# Patient Record
Sex: FEMALE | Race: WHITE
[De-identification: names, ages, dates, MRNs, and addresses within clinical notes are randomized per-mention and may not be internally consistent; named-entity substitution may affect disease eponyms.]

---

## 2017-09-19 ENCOUNTER — HOSPITAL ENCOUNTER (OUTPATIENT)
Dept: HOSPITAL 80 - FIMAGING | Age: 62
End: 2017-09-19
Attending: INTERNAL MEDICINE
Payer: COMMERCIAL

## 2017-09-19 DIAGNOSIS — Z12.31: Primary | ICD-10-CM

## 2017-09-19 PROCEDURE — G0202 SCR MAMMO BI INCL CAD: HCPCS

## 2017-11-04 ENCOUNTER — HOSPITAL ENCOUNTER (EMERGENCY)
Dept: HOSPITAL 80 - FED | Age: 62
Discharge: HOME | End: 2017-11-04
Payer: COMMERCIAL

## 2017-11-04 VITALS
DIASTOLIC BLOOD PRESSURE: 99 MMHG | SYSTOLIC BLOOD PRESSURE: 152 MMHG | RESPIRATION RATE: 16 BRPM | TEMPERATURE: 97.9 F | HEART RATE: 84 BPM | OXYGEN SATURATION: 93 %

## 2017-11-04 DIAGNOSIS — G44.89: Primary | ICD-10-CM

## 2017-11-04 DIAGNOSIS — E86.9: ICD-10-CM

## 2017-11-04 DIAGNOSIS — K59.00: ICD-10-CM

## 2017-11-04 PROCEDURE — 3E0337Z INTRODUCTION OF ELECTROLYTIC AND WATER BALANCE SUBSTANCE INTO PERIPHERAL VEIN, PERCUTANEOUS APPROACH: ICD-10-PCS | Performed by: EMERGENCY MEDICINE

## 2017-11-04 NOTE — EDPHY
HPI/HX/ROS/PE/MDM


Narrative: 





CHIEF COMPLAINT: Headache





HPI: The patient is a 63 y/o female arriving with her  complaining of 

headache onset Thursday, two days ago. She had elective abdominal surgery (

tummy tuck) on Thursday and had to be put on oxygen for shallow breathing in 

the recovery room. It took her several hours to come out of anesthesia. Upon 

waking up she began experiencing a headache that has not improved since. She 

describes the headache as in the frontal area of her head and "splitting". The 

prescribed analgesics (oxycodone) have not improved her headache. She has 

associated nausea and vomiting that interferes with her ability to eat. She 

denies any abdominal pain, visual changes, or other associated symptoms. She 

reports experiencing similar symptoms following a different surgery.





REVIEW OF SYSTEMS:


Aside from elements discussed in the HPI, a comprehensive 10-point review of 

systems was reviewed and is negative.





PMH: Hysterectomy, tummy tuck





SOCIAL HISTORY:  at bedside,





PHYSICAL EXAM:


General:Patient is alert, in no acute distress.


ENT:Eyes are normal to inspection.  ENT inspection normal.


Neck: Normal inspection.  Full range of motion.


Respiratory:No respiratory distress.  Breath sounds normal bilaterally.


Cardiovascular: Regular rate and rhythm.  Strong peripheral pulses.  Normal cap 

refill.


Abdomen:Bilateral MARTIN drains. The abdomen is nontender to palpation. There are 

no peritoneal signs. There are normal bowel sounds.


Back: Normal to inspection.  No tenderness to palpation.


Skin:  Horizontal incision on her abdomen that appears to be healing well. 

Normal color.  No rash.  Warm and dry.


Extremities: Normal appearance.  Full range of motion.


Neuro: Oriented x3.  Normal motor function.  Normal sensory function.





ED Course: 





Study: X-ray of the abdomen


Indication:    Nausea and vomiting post-surgery


Results: X-ray of the abdomen was obtained. The results of the study are: 

constipation and pleural effusions





The study was read by the radiologist, Dr. Navas. I viewed the images 

myself on the PACS system.











I assessed this patient and advised a CT which she refused and requested a shot 

of morphine which helped her in the past. I agreed to this plan and emphasized 

that a CT would be available if she changes her mind.  





I reassessed this patient and informed her of the results of her workup. I 

advised she can return home and she agrees to this plan. Follow-up instructions 

and return precautions given. 


MDM: 





This patient presents with complaint of headache since awakening from 

anesthesia after abdominoplasty a few days ago.  This is similar to another 

headache she had after an operation.  She declines imaging of her brain, and is 

aware I cannot rule out bleed, tumor, CVA or thrombus without testing.  

Incidentally, her RA O2 sat was noted to be quite low.  The patient was 

discharged from the hospital with home O2 but per  patient has been 

noncompliant with this as well as with IS use.  We started patient on O2 here 

and gave reglan IV, the combination of which seems to have improved her 

symptoms quite a bit.  Her abd xr does not show obstruction, but she does 

appear fairly constipated.  It sounds like patient has not really been 

complaint with stool softeners.  After much discussion, patient would like to 

go home.  I strongly encouraged her to ambulate as much as possible and to cut 

down on narcotic use.  We discussed strict return precautions. 





- Data Points


Imaging Results: 


 Imaging Impressions





Abdomen X-Ray  11/04/17 19:41


Impression: 


1. Moderate stool in the proximal colon without evidence of obstruction.


2. Small bilateral pleural effusions with probable associated atelectasis.








Chest X-Ray  11/04/17 19:41


Impression:  Small bilateral pleural effusions with streaky basilar opacities 

suggesting atelectasis.











Imaging: Discussed imaging studies w/ On call Radiologist, I viewed and 

interpreted images myself


Medications Given: 


 








Discontinued Medications





Diphenhydramine HCl (Benadryl Injection)  25 mg IVP EDNOW ONE


   Stop: 11/04/17 19:22


   Last Admin: 11/04/17 19:32 Dose:  25 mg


Sodium Chloride (Ns)  1,000 mls @ 0 mls/hr IV ONCE ONE; Wide Open


   PRN Reason: Protocol


   Stop: 11/04/17 19:16


   Last Admin: 11/04/17 19:19 Dose:  1,000 mls


Sodium Chloride (Ns)  1,000 mls @ 0 mls/hr IV EDNOW ONE; Wide Open


   PRN Reason: Protocol


   Stop: 11/04/17 20:30


   Last Admin: 11/04/17 20:32 Dose:  1,000 mls


Metoclopramide HCl (Reglan Injection)  10 mg IVP EDNOW ONE


   Stop: 11/04/17 19:22


   Last Admin: 11/04/17 19:32 Dose:  10 mg


Morphine Sulfate (Morphine)  4 mg IVP EDNOW ONE


   Stop: 11/04/17 19:23


   Last Admin: 11/04/17 20:21 Dose:  Not Given


Ondansetron HCl (Zofran)  4 mg IVP EDNOW ONE


   Stop: 11/04/17 19:16


   Last Admin: 11/04/17 19:19 Dose:  4 mg








General


Time Seen by Provider: 11/04/17 19:09


Initial Vital Signs: 


 Initial Vital Signs











Temperature (C)  36.9 C   11/04/17 18:53


 


Heart Rate  85   11/04/17 18:53


 


Respiratory Rate  20   11/04/17 18:53


 


Blood Pressure  158/108 H  11/04/17 18:53


 


O2 Sat (%)  89 L  11/04/17 18:53








 











O2 Delivery Mode               Room Air


 


O2 (L/minute)                  15














Allergies/Adverse Reactions: 


 





No Known Allergies Allergy (Verified 11/04/17 18:52)


 








Home Medications: 














 Medication  Instructions  Recorded


 


Acetaminophen [Tylenol 325mg (*)] 325 mg PO 11/04/17


 


Cephalexin [Keflex (*)] 250 mg PO 11/04/17


 


Cyclobenzaprine [Flexeril 10 MG 10 mg PO 11/04/17





(*)]  


 


Docusate Sodium [Colace 100 MG (*)] 100 mg PO BID 11/04/17


 


H R T  11/04/17


 


Hydrocodone/APAP 5/325 [Norco 1 each PO 11/04/17





5/325 (*)]  


 


Ondansetron Odt [Zofran Odt 4 mg 4 mg PO Q4 11/04/17





(*)]  


 


oxyCODONE HCL [Oxycodone HCl] 5 mg PO 11/04/17














Departure





- Departure


Disposition: Home, Routine, Self-Care


Clinical Impression: 


Constipation


Qualifiers:


 Constipation type: other constipation type Qualified Code(s): K59.09 - Other 

constipation





Headache


Qualifiers:


 Headache type: other headache syndrome Qualified Code(s): G44.89 - Other 

headache syndrome





Condition: Good


Instructions:  Constipation (ED), Acute Headache (ED)


Additional Instructions: 


1. Continue taking prescribed medicines as directed as needed.


2. Follow-up with your primary care provider for unimproved symptoms in 2-3 

days.


3. Return to the ED for worsening of condition.


Referrals: 


Sulma Peter MD [Primary Care Provider] - As per Instructions


Report Scribed for: Reji Sands


Report Scribed by: Amisha Casarez


Date of Report: 11/04/17


Time of Report: 19:24


Physician Review and Approval Statement: Portions of this note were transcribed 

by an ED scribe.  I personally performed the history, physical exam, and 

medical decision making; and confirm the accuracy of the information in the 

transcribed note.

## 2017-11-04 NOTE — EDPHY
HPI/HX/ROS/PE/MDM


Narrative: 





CHIEF COMPLAINT: Headache





HPI: The patient is a 61 y/o female arriving with her  complaining of 

headache onset Thursday, two days ago. She had elective abdominal surgery (

tummy tuck) on Thursday and had to be put on oxygen for shallow breathing in 

the recovery room. It took her several hours to come out of anesthesia. Upon 

waking up she began experiencing a headache that has not improved since. She 

describes the headache as in the frontal area of her head and "splitting". The 

prescribed analgesics (oxycodone) have not improved her headache. She has 

associated nausea and vomiting that interferes with her ability to eat. She 

denies any abdominal pain, visual changes, or other associated symptoms. She 

reports experiencing similar symptoms following a different surgery.





REVIEW OF SYSTEMS:


Aside from elements discussed in the HPI, a comprehensive 10-point review of 

systems was reviewed and is negative.





PMH: Hysterectomy, tummy tuck





SOCIAL HISTORY:  at bedside,





PHYSICAL EXAM:


General:Patient is alert, in no acute distress.


ENT:Eyes are normal to inspection.  ENT inspection normal.


Neck: Normal inspection.  Full range of motion.


Respiratory:No respiratory distress.  Breath sounds normal bilaterally.


Cardiovascular: Regular rate and rhythm.  Strong peripheral pulses.  Normal cap 

refill.


Abdomen:Bilateral MARTIN drains. The abdomen is nontender to palpation. There are 

no peritoneal signs. There are normal bowel sounds.


Back: Normal to inspection.  No tenderness to palpation.


Skin:  Horizontal incision on her abdomen that appears to be healing well. 

Normal color.  No rash.  Warm and dry.


Extremities: Normal appearance.  Full range of motion.


Neuro: Oriented x3.  Normal motor function.  Normal sensory function.





ED Course: 





Study: X-ray of the abdomen


Indication:    Nausea and vomiting post-surgery


Results: X-ray of the abdomen was obtained. The results of the study are: 

constipation and pleural effusions





The study was read by the radiologist, Dr. Navas. I viewed the images 

myself on the PACS system.











I assessed this patient and advised a CT which she refused and requested a shot 

of morphine which helped her in the past. I agreed to this plan and emphasized 

that a CT would be available if she changes her mind.  





I reassessed this patient and informed her of the results of her workup. I 

advised she can return home and she agrees to this plan. Follow-up instructions 

and return precautions given. 


MDM: 





This patient presents with complaint of headache since awakening from 

anesthesia after abdominoplasty a few days ago.  This is similar to another 

headache she had after an operation.  She declines imaging of her brain, and is 

aware I cannot rule out bleed, tumor, CVA or thrombus without testing.  

Incidentally, her RA O2 sat was noted to be quite low.  The patient was 

discharged from the hospital with home O2 but per  patient has been 

noncompliant with this as well as with IS use.  We started patient on O2 here 

and gave reglan IV, the combination of which seems to have improved her 

symptoms quite a bit.  Her abd xr does not show obstruction, but she does 

appear fairly constipated.  It sounds like patient has not really been 

complaint with stool softeners.  After much discussion, patient would like to 

go home.  I strongly encouraged her to ambulate as much as possible and to cut 

down on narcotic use.  We discussed strict return precautions. 





- Data Points


Imaging Results: 


 Imaging Impressions





Abdomen X-Ray  11/04/17 19:41


Impression: 


1. Moderate stool in the proximal colon without evidence of obstruction.


2. Small bilateral pleural effusions with probable associated atelectasis.








Chest X-Ray  11/04/17 19:41


Impression:  Small bilateral pleural effusions with streaky basilar opacities 

suggesting atelectasis.











Imaging: Discussed imaging studies w/ On call Radiologist, I viewed and 

interpreted images myself


Medications Given: 


 








Discontinued Medications





Diphenhydramine HCl (Benadryl Injection)  25 mg IVP EDNOW ONE


   Stop: 11/04/17 19:22


   Last Admin: 11/04/17 19:32 Dose:  25 mg


Sodium Chloride (Ns)  1,000 mls @ 0 mls/hr IV ONCE ONE; Wide Open


   PRN Reason: Protocol


   Stop: 11/04/17 19:16


   Last Admin: 11/04/17 19:19 Dose:  1,000 mls


Sodium Chloride (Ns)  1,000 mls @ 0 mls/hr IV EDNOW ONE; Wide Open


   PRN Reason: Protocol


   Stop: 11/04/17 20:30


   Last Admin: 11/04/17 20:32 Dose:  1,000 mls


Metoclopramide HCl (Reglan Injection)  10 mg IVP EDNOW ONE


   Stop: 11/04/17 19:22


   Last Admin: 11/04/17 19:32 Dose:  10 mg


Morphine Sulfate (Morphine)  4 mg IVP EDNOW ONE


   Stop: 11/04/17 19:23


   Last Admin: 11/04/17 20:21 Dose:  Not Given


Ondansetron HCl (Zofran)  4 mg IVP EDNOW ONE


   Stop: 11/04/17 19:16


   Last Admin: 11/04/17 19:19 Dose:  4 mg








General


Time Seen by Provider: 11/04/17 19:09


Initial Vital Signs: 


 Initial Vital Signs











Temperature (C)  36.9 C   11/04/17 18:53


 


Heart Rate  85   11/04/17 18:53


 


Respiratory Rate  20   11/04/17 18:53


 


Blood Pressure  158/108 H  11/04/17 18:53


 


O2 Sat (%)  89 L  11/04/17 18:53








 











O2 Delivery Mode               Room Air


 


O2 (L/minute)                  15














Allergies/Adverse Reactions: 


 





No Known Allergies Allergy (Verified 11/04/17 18:52)


 








Home Medications: 














 Medication  Instructions  Recorded


 


Acetaminophen [Tylenol 325mg (*)] 325 mg PO 11/04/17


 


Cephalexin [Keflex (*)] 250 mg PO 11/04/17


 


Cyclobenzaprine [Flexeril 10 MG 10 mg PO 11/04/17





(*)]  


 


Docusate Sodium [Colace 100 MG (*)] 100 mg PO BID 11/04/17


 


H R T  11/04/17


 


Hydrocodone/APAP 5/325 [Norco 1 each PO 11/04/17





5/325 (*)]  


 


Ondansetron Odt [Zofran Odt 4 mg 4 mg PO Q4 11/04/17





(*)]  


 


oxyCODONE HCL [Oxycodone HCl] 5 mg PO 11/04/17














Departure





- Departure


Disposition: Home, Routine, Self-Care


Clinical Impression: 


Constipation


Qualifiers:


 Constipation type: other constipation type Qualified Code(s): K59.09 - Other 

constipation





Headache


Qualifiers:


 Headache type: other headache syndrome Qualified Code(s): G44.89 - Other 

headache syndrome





Condition: Good


Instructions:  Constipation (ED), Acute Headache (ED)


Additional Instructions: 


1. Continue taking prescribed medicines as directed as needed.


2. Follow-up with your primary care provider for unimproved symptoms in 2-3 

days.


3. Return to the ED for worsening of condition.


Referrals: 


Sulma Peter MD [Primary Care Provider] - As per Instructions


Report Scribed for: Reji Sands


Report Scribed by: Amisha Casarez


Date of Report: 11/04/17


Time of Report: 19:24


Physician Review and Approval Statement: Portions of this note were transcribed 

by an ED scribe.  I personally performed the history, physical exam, and 

medical decision making; and confirm the accuracy of the information in the 

transcribed note.

## 2018-10-24 ENCOUNTER — HOSPITAL ENCOUNTER (OUTPATIENT)
Dept: HOSPITAL 80 - FIMAGING | Age: 63
End: 2018-10-24
Attending: INTERNAL MEDICINE
Payer: COMMERCIAL

## 2018-10-24 DIAGNOSIS — Z12.31: Primary | ICD-10-CM

## 2018-10-24 DIAGNOSIS — Z80.3: ICD-10-CM
